# Patient Record
Sex: FEMALE | Race: BLACK OR AFRICAN AMERICAN | NOT HISPANIC OR LATINO | URBAN - METROPOLITAN AREA
[De-identification: names, ages, dates, MRNs, and addresses within clinical notes are randomized per-mention and may not be internally consistent; named-entity substitution may affect disease eponyms.]

---

## 2019-05-11 ENCOUNTER — COMMUNICATION - HEALTHEAST (OUTPATIENT)
Dept: SCHEDULING | Facility: CLINIC | Age: 19
End: 2019-05-11

## 2019-05-16 ENCOUNTER — COMMUNICATION - HEALTHEAST (OUTPATIENT)
Dept: SCHEDULING | Facility: CLINIC | Age: 19
End: 2019-05-16

## 2020-03-14 ENCOUNTER — HOSPITAL ENCOUNTER (OUTPATIENT)
Dept: OBGYN | Facility: HOSPITAL | Age: 20
Discharge: HOME | End: 2020-03-14
Attending: FAMILY MEDICINE | Admitting: FAMILY MEDICINE

## 2020-03-14 LAB
ALBUMIN SERPL-MCNC: 4 G/DL (ref 3.5–5)
ALBUMIN UR-MCNC: ABNORMAL MG/DL
ALP SERPL-CCNC: 48 U/L (ref 45–120)
ALT SERPL W P-5'-P-CCNC: 12 U/L (ref 0–45)
AMORPH CRY #/AREA URNS HPF: ABNORMAL /[HPF]
AMPHETAMINES UR QL SCN: ABNORMAL
ANION GAP SERPL CALCULATED.3IONS-SCNC: 9 MMOL/L (ref 5–18)
APPEARANCE UR: ABNORMAL
AST SERPL W P-5'-P-CCNC: 17 U/L (ref 0–40)
BACTERIA #/AREA URNS HPF: ABNORMAL HPF
BARBITURATES UR QL: ABNORMAL
BASOPHILS # BLD AUTO: 0 THOU/UL (ref 0–0.2)
BASOPHILS NFR BLD AUTO: 0 % (ref 0–2)
BENZODIAZ UR QL: ABNORMAL
BILIRUB SERPL-MCNC: 0.3 MG/DL (ref 0–1)
BILIRUB UR QL STRIP: NEGATIVE
BUN SERPL-MCNC: 5 MG/DL (ref 8–22)
CALCIUM SERPL-MCNC: 9.8 MG/DL (ref 8.5–10.5)
CANNABINOIDS UR QL SCN: ABNORMAL
CHLORIDE BLD-SCNC: 107 MMOL/L (ref 98–107)
CO2 SERPL-SCNC: 23 MMOL/L (ref 22–31)
COCAINE UR QL: ABNORMAL
COLOR UR AUTO: YELLOW
CREAT SERPL-MCNC: 0.64 MG/DL (ref 0.6–1.1)
CREAT UR-MCNC: 131.5 MG/DL
EOSINOPHIL # BLD AUTO: 0 THOU/UL (ref 0–0.4)
EOSINOPHIL NFR BLD AUTO: 0 % (ref 0–6)
ERYTHROCYTE [DISTWIDTH] IN BLOOD BY AUTOMATED COUNT: 13.1 % (ref 11–14.5)
ETHANOL UR CFM-MCNC: <10 MG/DL
GFR SERPL CREATININE-BSD FRML MDRD: >60 ML/MIN/1.73M2
GLUCOSE BLD-MCNC: 82 MG/DL (ref 70–125)
GLUCOSE UR STRIP-MCNC: NEGATIVE MG/DL
HCT VFR BLD AUTO: 39 % (ref 35–47)
HGB BLD-MCNC: 13 G/DL (ref 12–16)
HGB UR QL STRIP: NEGATIVE
KETONES UR STRIP-MCNC: NEGATIVE MG/DL
LEUKOCYTE ESTERASE UR QL STRIP: ABNORMAL
LIPASE SERPL-CCNC: 52 U/L (ref 0–52)
LYMPHOCYTES # BLD AUTO: 1.6 THOU/UL (ref 0.8–4.4)
LYMPHOCYTES NFR BLD AUTO: 14 % (ref 20–40)
MCH RBC QN AUTO: 33.3 PG (ref 27–34)
MCHC RBC AUTO-ENTMCNC: 33.3 G/DL (ref 32–36)
MCV RBC AUTO: 100 FL (ref 80–100)
METHADONE UR QL SCN: ABNORMAL
MONOCYTES # BLD AUTO: 0.8 THOU/UL (ref 0–0.9)
MONOCYTES NFR BLD AUTO: 7 % (ref 2–10)
MUCOUS THREADS #/AREA URNS LPF: ABNORMAL LPF
NEUTROPHILS # BLD AUTO: 9.2 THOU/UL (ref 2–7.7)
NEUTROPHILS NFR BLD AUTO: 79 % (ref 50–70)
NITRATE UR QL: NEGATIVE
OPIATES UR QL SCN: ABNORMAL
OXYCODONE UR QL: ABNORMAL
PCP UR QL SCN: ABNORMAL
PH UR STRIP: 7.5 [PH] (ref 4.5–8)
PLATELET # BLD AUTO: 271 THOU/UL (ref 140–440)
PMV BLD AUTO: 10 FL (ref 8.5–12.5)
POTASSIUM BLD-SCNC: 4.2 MMOL/L (ref 3.5–5)
PROT SERPL-MCNC: 8.3 G/DL (ref 6–8)
RBC # BLD AUTO: 3.9 MILL/UL (ref 3.8–5.4)
RBC #/AREA URNS AUTO: ABNORMAL HPF
SODIUM SERPL-SCNC: 139 MMOL/L (ref 136–145)
SP GR UR STRIP: 1.01 (ref 1–1.03)
SQUAMOUS #/AREA URNS AUTO: >100 LPF
UROBILINOGEN UR STRIP-ACNC: ABNORMAL
WBC #/AREA URNS AUTO: ABNORMAL HPF
WBC: 11.7 THOU/UL (ref 4–11)

## 2020-03-14 ASSESSMENT — MIFFLIN-ST. JEOR: SCORE: 1217.21

## 2020-03-15 LAB — BACTERIA SPEC CULT: NO GROWTH

## 2020-03-17 ENCOUNTER — RECORDS - HEALTHEAST (OUTPATIENT)
Dept: OBGYN | Facility: HOSPITAL | Age: 20
End: 2020-03-17

## 2020-03-19 LAB — CARBOXYTHC UR-MCNC: >500 NG/ML

## 2021-05-28 NOTE — TELEPHONE ENCOUNTER
"PCP [facility]. 1st OBGYN appointment on [facility]/18 [facility].   7 wks pregnant (8wks on Sat)  Vagnnal bleeding: when she wiped after using the bathroom: light pink. Today is 3rd or 4th day. Today she just had a dime sized spot of blood and a small clot the size of a 1/2 of a pea. No further bleeding noted.  T=during call 97.7 She has had a little pain intermittently, =\"1\".  First pregnancy.     Reason for Disposition    SPOTTING lasts > 48 hours or spotting happens more than once in a week    Protocols used: PREGNANCY - VAGINAL BLEEDING LESS THAN 20 WEEKS EGA-A-    Triaged to a disposition of See PCP when office is open (within 3 days). Advised to contact clinic tomorrow am--to discuss if she can wait until her appointment on 5/18 or if she should be seen sooner.    If sx worsen, then was advised to call back.     Carey Weaver RN [facility] Triage Nurse  "

## 2021-06-04 VITALS — HEIGHT: 60 IN | WEIGHT: 117 LBS | BODY MASS INDEX: 22.97 KG/M2

## 2021-06-06 NOTE — PROGRESS NOTES
"OBSTETRICS TRIAGE ASSESSMENT NOTE  Rojelio Landis is a 19 y.o.  at 24w2D based on fetal survey US (outside facility) who has presented to maternity care for further evaluation of abdominal pain. No bleeding, leakage of fluids, contractions. Baby is moving normally. [facility] and plans to deliver at [place].     She took \"two pills\" around 11 or 12 today  Got nauseated 30m after  Her friend also got nauseated  Friend's mom said the pills were to help with immunity     Later was confirmed that these pills were 50mg zinc tablets per friend report    She admits to being very anxious. Has had a miscarriage before.     She has had diarrhea chronically - thinks this may be a little worse. No blood.     PRENATAL CARE  Seen by [place] and [facility]    OB History        1    Para        Term                AB        Living           SAB        TAB        Ectopic        Multiple        Live Births                     PAST MEDICAL HISTORY  Past Medical History:   Diagnosis Date     Heart murmur        PAST SURGICAL HISTORY   No past surgical history on file.    MEDICATIONS    Current Facility-Administered Medications:      lactated Ringers, 0-500 mL/hr, Intravenous, Continuous, Jalyn Gonzalez MD    SOCIAL HISTORY:   Social History     Socioeconomic History     Marital status: Single     Spouse name: Not on file     Number of children: Not on file     Years of education: Not on file     Highest education level: Not on file   Occupational History     Not on file   Social Needs     Financial resource strain: Not on file     Food insecurity     Worry: Not on file     Inability: Not on file     Transportation needs     Medical: Not on file     Non-medical: Not on file   Tobacco Use     Smoking status: Never Smoker   Substance and Sexual Activity     Alcohol use: Not on file     Drug use: Not on file     Sexual activity: Not on file   Lifestyle     Physical activity     " Days per week: Not on file     Minutes per session: Not on file     Stress: Not on file   Relationships     Social connections     Talks on phone: Not on file     Gets together: Not on file     Attends Confucianist service: Not on file     Active member of club or organization: Not on file     Attends meetings of clubs or organizations: Not on file     Relationship status: Not on file     Intimate partner violence     Fear of current or ex partner: Not on file     Emotionally abused: Not on file     Physically abused: Not on file     Forced sexual activity: Not on file   Other Topics Concern     Not on file   Social History Narrative     Not on file       PHYSICAL EXAMINATION   /70 (Patient Position: Semi-davis, Cuff Size: Adult Regular)   Pulse 78   Temp 98.3  F (36.8  C) (Oral)   Resp 24   Ht 5' (1.524 m)   Wt 117 lb (53.1 kg)   LMP 2019 (Exact Date)   SpO2 100%   BMI 22.85 kg/m    Gen: anxious appearing, lying in bed  CV: Systolic murmur heard throughout, regular  Lungs: clear to ausculation, good air movement throughout  Abdomen: Gravid, trace right sided tenderness, soft  Extremities: no lower extremity edema      CONTRACTIONS  Noisy strip    LAB RESULTS  Personally reviewed.  No results found for this or any previous visit (from the past 24 hour(s)).    ASSESSMENT/PLAN:   19 y.o.  at Unknown gestation presenting to labor & delivery for abdominal pain likely from gastroenteritis vs irritation from ingestion of zinc.    Ingestion of zinc  Abdominal pain  Per micromedex - side effect of zinc ingestion is GI irritation as well as elevation in lipase enzyme. The dose she ingested (100mg) is a safe amount. She has had diarrhea prior to taking so possible she has some overlying gastroenteritis. Appears well hydrated on exam. Not associated with eating and pain location making gallbladder source less likely. Not consistent with appendicitis.    -STAT CBC, CMP, Lipase, and UA    She will likely  discharge pending resolution of symptoms and normal labs.     Jose Grande MD   [facility] [place] Resident    Precepted patient with Dr. Aaron Flynn.    This chart is completed utilizing dictation software; typos and/or incorrect word substitutions may unintentionally occur.     Addendum 1510:  Labs returned.  Electrolytes all within normal limits.  Liver enzymes normal.  Lipase within normal limits.  WBC trace elevated to 11.7, but this could be from viral gastro-as above.  UA contaminated with squamous epithelial cells, but does not appear consistent with UTI.  Urine tox positive for THC.    Will await LR bolus completion and reevaluate patient.  If she is feeling better she is safe for discharge.  Follow-up with [facility]face clinic next week.

## 2021-06-06 NOTE — PROGRESS NOTES
1400: Pt came to [facility] unit through ER complaining of stomach pain. Pt reports she was at her friends house and her friends mom had herself and her friend take two white pills that she told them would help boost their immune systems. Shortly after taking the pills herself and her friend threw up and had diarrhea. Pt presented to [facility] unit with shaking, feeling cold, sever stomach pain, but not currently feeling nauseated. Pt came to [facility] unit with her cousin who the pt lives with. The cousin called the friend to see what the white pills were and the friend states they were zinc 50 mg a pill and the pt took two. Uterine activity is irritable. Stomach palpates soft to touch. Pt denies any vaginal bleeding or leaking of fluid. Requested that the resident comes and evaluate pt in person at the bedside. They state they will make there way shortly.     1410: Resident at bedside to evaluate pt. See new orders for labs and LR bolus.     1650: Updated resident that fluid bolus is complete. Pt states she still feels some cramping occasional, but the severity is less. Pt feels okay to discharge to home. Resident okay for pt to discharge to home at this time, see new order. Follow up with regular prenatal provider as scheduled.     1718: Discharge summary and education gone over with pt. All questions and concerns answered at this time.
